# Patient Record
Sex: MALE | Race: BLACK OR AFRICAN AMERICAN | Employment: FULL TIME | URBAN - METROPOLITAN AREA
[De-identification: names, ages, dates, MRNs, and addresses within clinical notes are randomized per-mention and may not be internally consistent; named-entity substitution may affect disease eponyms.]

---

## 2023-01-25 ENCOUNTER — APPOINTMENT (OUTPATIENT)
Dept: GENERAL RADIOLOGY | Age: 43
End: 2023-01-25
Payer: COMMERCIAL

## 2023-01-25 ENCOUNTER — HOSPITAL ENCOUNTER (EMERGENCY)
Age: 43
Discharge: HOME | End: 2023-01-25
Attending: EMERGENCY MEDICINE
Payer: COMMERCIAL

## 2023-01-25 VITALS
TEMPERATURE: 98.1 F | HEIGHT: 68 IN | OXYGEN SATURATION: 98 % | SYSTOLIC BLOOD PRESSURE: 136 MMHG | WEIGHT: 202.2 LBS | RESPIRATION RATE: 10 BRPM | HEART RATE: 68 BPM | DIASTOLIC BLOOD PRESSURE: 85 MMHG | BODY MASS INDEX: 30.65 KG/M2

## 2023-01-25 DIAGNOSIS — S43.402A SPRAIN OF LEFT SHOULDER, UNSPECIFIED SHOULDER SPRAIN TYPE, INITIAL ENCOUNTER: Primary | ICD-10-CM

## 2023-01-25 DIAGNOSIS — S50.02XA CONTUSION OF LEFT ELBOW, INITIAL ENCOUNTER: ICD-10-CM

## 2023-01-25 PROCEDURE — 73030 X-RAY EXAM OF SHOULDER: CPT

## 2023-01-25 PROCEDURE — 99283 EMERGENCY DEPT VISIT LOW MDM: CPT

## 2023-01-25 PROCEDURE — 73080 X-RAY EXAM OF ELBOW: CPT

## 2023-01-25 RX ORDER — METHYLPREDNISOLONE 4 MG/1
TABLET ORAL
Qty: 1 KIT | Refills: 0 | Status: SHIPPED | OUTPATIENT
Start: 2023-01-25 | End: 2023-01-31

## 2023-01-25 RX ORDER — DICLOFENAC SODIUM 75 MG/1
75 TABLET, DELAYED RELEASE ORAL 2 TIMES DAILY
Qty: 20 TABLET | Refills: 0 | Status: SHIPPED | OUTPATIENT
Start: 2023-01-25

## 2023-01-25 NOTE — ED NOTES
Patient given d/c instructions with return verbalization including medications. Emphasis on f/u, to return with worsening . Pt ambulated to lobby with steady gait.      aWnda Cole RN  01/25/23 8165

## 2023-01-25 NOTE — ED PROVIDER NOTES
[facility]            Pt Name: Juvenal Florez   MRN: [id number]   [place] 1980   Date of evaluation: 1/25/2023   Provider: Joanna Corral DO   PCP: [facility]   Note Started: 5:02 PM EST 1/25/23          CHIEF COMPLAINT     Chief Complaint   Patient presents with    Arm Pain             HISTORY OF PRESENT ILLNESS:   History from : Patient   Limitations to history : None     Juvenal Florez is a 43 y.o. male who presents to the emergency department complaining of left shoulder and elbow pain. Patient states that 1 week ago he was wrestling when he fell on his left shoulder/elbow. Patient reports excruciating pain at that time. It has improved somewhat but continues to be moderate-severe with movement especially including abduction. Patient denies muscle weakness or paresthesias. No head injury reported. Nursing Notes were all reviewed and agreed with, or any disagreements were addressed in the HPI. REVIEW OF SYSTEMS :    Positives and Pertinent negatives as per HPI. MEDICAL HISTORY   has no past medical history on file. No past surgical history on file. [address]       Discharge Medication List as of 1/25/2023  4:24 PM         SCREENINGS          Courtney Coma Scale  Eye Opening: Spontaneous  Best Verbal Response: Oriented  Best Motor Response: Obeys commands  Courtney Coma Scale Score: 15                CIWA Assessment  BP: 136/85  Heart Rate: 68                  PHYSICAL EXAM :  ED Triage Vitals [01/25/23 1435]   BP Temp Temp Source Heart Rate Resp SpO2 Height Weight   136/85 98.1 °F (36.7 °C) Oral 68 10 98 % 5' 8\" (1.727 m) 202 lb 3.2 oz (91.7 kg)      GENERAL APPEARANCE: Awake and alert. Cooperative. No acute distress. HEAD: Normocephalic. Atraumatic. EYES: PERRL. EOM's grossly intact. ENT: Mucous membranes are moist.   NECK: Supple, trachea midline. HEART: RRR. Normal S1, S2.  No murmurs, rubs or gallops. LUNGS: Respirations unlabored. CTAB. Good air exchange. No wheezes, rales, or rhonchi. Speaking comfortably in full sentences. ABDOMEN: Soft. Non-distended. Non-tender. No guarding or rebound. Normal Bowel sounds. EXTREMITIES:   Spine: No midline tenderness of cervical, thoracic, lumbar spine. Left shoulder: No tenderness over the clavicle. Diffuse tenderness over the shoulder/acromion. Decreased ability to abduct arm. Moderate discomfort with range of motion in all directions. Left elbow: Contusion with mild olecranon swelling noted. Full range of motion. Distal pulses and sensation intact. SKIN: Warm and dry. No acute rashes. NEUROLOGICAL: Alert and oriented X 3. CN II-XII intact. No gross facial drooping. Strength 5/5 in all extremities. Sensation intact. No pronator drift. Normal coordination. Gait normal.   PSYCHIATRIC: Normal mood and affect. DIAGNOSTIC RESULTS     LABS:   Labs Reviewed - No data to display   When ordered only abnormal lab results are displayed. All other labs were within normal range or not returned as of this dictation. RADIOLOGY:      Non-plain film images such as CT, Ultrasound and MRI are read by the radiologist. Plain radiographic images are visualized and preliminarily interpreted by the ED Provider with the below findings:   Interpretation per the Radiologist below, if available at the time of this note:     XR ELBOW LEFT (MIN 3 VIEWS)   Final Result      No acute fracture seen. XR SHOULDER LEFT (MIN 2 VIEWS)   Final Result      No acute fracture seen. XR ELBOW LEFT (MIN 3 VIEWS)    Result Date: 1/25/2023  LEFT ELBOW  3 views HISTORY: Fall with pain COMPARISON: none FINDINGS: No evidence of acute fracture or traumatic bony abnormality is seen. No significant degenerative change is seen. No acute fracture seen.      XR SHOULDER LEFT (MIN 2 VIEWS)    Result Date: 1/25/2023  LEFT SHOULDER  3 views HISTORY: Shoulder pain COMPARISON: none FINDINGS: No evidence of acute fracture or traumatic bony abnormality is seen. No acute fracture seen. Vitals:    Vitals:    01/25/23 1435   BP: 136/85   Pulse: 68   Resp: 10   Temp: 98.1 °F (36.7 °C)   TempSrc: Oral   SpO2: 98%   Weight: 202 lb 3.2 oz (91.7 kg)   Height: 5' 8\" (1.727 m)             Is this patient to be included in the SEP-1 Core Measure due to severe sepsis or septic shock? No   Exclusion criteria - the patient is NOT to be included for SEP-1 Core Measure due to: Infection is not suspected       CC/HPI Summary, DDx, ED Course, and Reassessment: Patient presents to the emergency department 1 week post left shoulder/elbow injury. Range of motion is limited. No gross deformities. Distal pulses and sensation intact. Imaging is without evidence of fracture or dislocation. Symptoms are concerning for rotator cuff injury. Patient be started on anti-inflammatories and steroids. Patient was given the following medications:   Medications - No data to display     CONSULTS:   None   Discussion with Other Professionals: None   {Social Determinants: None   Chronic Conditions:  None    Records Reviewed: NA      Disposition Considerations:    I am the Primary Clinician of Record. FINAL IMPRESSION    1. Sprain of left shoulder, unspecified shoulder sprain type, initial encounter    2.  Contusion of left elbow, initial encounter           DISPOSITION/PLAN     PATIENT REFERRED TO:   [facility]  [address] [place] [address]      As needed    Charity Knutson MD  [address] [place] [address]  [phone]    In 3 days         DISCHARGE MEDICATIONS:   Discharge Medication List as of 1/25/2023  4:24 PM        START taking these medications    Details   diclofenac (VOLTAREN) 75 MG EC tablet Take 1 tablet by mouth 2 times daily, Disp-20 tablet, R-0Print      methylPREDNISolone (MEDROL, ZEKE,) 4 MG tablet Take by mouth., Disp-1 kit, R-0Print              DISCONTINUED MEDICATIONS:   Discharge Medication List as of 1/25/2023  4:24 PM                 (Please note that portions of this note were completed with a voice recognition program.  Efforts were made to edit the dictations but occasionally words are mis-transcribed.)       Rufina Aase, DO (electronically signed)               Rufina Aase, DO  01/25/23 8125